# Patient Record
Sex: FEMALE | Race: WHITE | Employment: OTHER | URBAN - METROPOLITAN AREA
[De-identification: names, ages, dates, MRNs, and addresses within clinical notes are randomized per-mention and may not be internally consistent; named-entity substitution may affect disease eponyms.]

---

## 2017-11-29 ENCOUNTER — OFFICE VISIT (OUTPATIENT)
Dept: SURGERY | Age: 67
End: 2017-11-29

## 2017-11-29 VITALS
SYSTOLIC BLOOD PRESSURE: 140 MMHG | TEMPERATURE: 98.2 F | OXYGEN SATURATION: 97 % | RESPIRATION RATE: 16 BRPM | WEIGHT: 190 LBS | HEART RATE: 71 BPM | DIASTOLIC BLOOD PRESSURE: 80 MMHG | BODY MASS INDEX: 35.87 KG/M2 | HEIGHT: 61 IN

## 2017-11-29 DIAGNOSIS — M79.662 PAIN OF LEFT CALF: Primary | ICD-10-CM

## 2017-11-29 NOTE — PROGRESS NOTES
Surgery Consult    Subjective:      Arnel Huntley is a 77 y.o.  female who was referred for evaluation of pain in the soft tissues of the left lower anterolateral calf. No systemic symptoms, and the pain is not sconstant.     Patient Active Problem List    Diagnosis Date Noted    Pain of left calf 11/29/2017    Spondylolisthesis of lumbar region 07/06/2016    Fibromyalgia syndrome 11/05/2010    Left knee DJD 11/03/2010    Right knee DJD 11/03/2010    Hx-TIA (transient ischemic attack) 11/03/2010    Radiculopathy of leg 11/03/2010    Dry eyes 11/03/2010     Past Medical History:   Diagnosis Date    Arrhythmia     \"SKIPS BEAT\"    Arthritis     Cancer ([address]     BCCA    GERD (gastroesophageal reflux disease)     Nausea & vomiting     Other ill-defined conditions(799.89) 1980s    Shingles    Pain of left calf 11/29/2017    Psychiatric disorder     Anxiety    Spondylolisthesis of lumbar region 7/6/2016    Stroke ([address] 2006    TIA    Thromboembolus ([address] 1986 2000    LEFT DVT    Thyroid disease     Unspecified adverse effect of anesthesia     Headaches      Past Surgical History:   Procedure Laterality Date    BREAST SURGERY PROCEDURE UNLISTED Right 1984    Excision of Benign Mass    DRAIN/INJECT LARGE JOINT/BURSA  11/5/2010    STEROID INJECTION performed by Enma Morse at [address] HX HERNIA REPAIR Right 1990'S    INGUINAL    HX HYSTERECTOMY  1985    PARTIAL    HX KNEE ARTHROSCOPY Left     HX ORTHOPAEDIC Bilateral 2008    Bilateral Thumb surgery    HX ORTHOPAEDIC Left 2012    REVISION TOTAL KNEE    HX ORTHOPAEDIC  2016    back surgery    HX TONSILLECTOMY  1975    TOTAL KNEE ARTHROPLASTY  11/5/2010    KNEE ARTHROPLASTY TOTAL performed by Enma Morse at [address] [place] [address] MAIN OR      Social History   Substance Use Topics    Smoking status: Never Smoker    Smokeless tobacco: Never Used    Alcohol use Yes      Comment: OCCASIONALLY      Family History   Problem Relation Age of Onset    Cancer Mother      BREAST AND OVARIAN    Other Mother      BRAIN ANEURYSM    Anesth Problems Neg Hx       Current Outpatient Prescriptions   Medication Sig    gabapentin (NEURONTIN) 100 mg capsule Take 100 mg by mouth three (3) times daily.  venlafaxine-SR (EFFEXOR XR) 150 mg capsule Take 150 mg by mouth nightly.  levothyroxine (SYNTHROID) 150 mcg tablet Take 150 mcg by mouth daily (before breakfast).  ergocalciferol (ERGOCALCIFEROL) 50,000 unit capsule Take 50,000 Units by mouth every seven (7) days.  zolpidem (AMBIEN) 10 mg tablet Take 10 mg by mouth nightly as needed.  oxyCODONE IR (ROXICODONE) 10 mg tab immediate release tablet Take 1 Tab by mouth every three (3) hours as needed. Max Daily Amount: 80 mg.    senna-docusate (PERICOLACE) 8.6-50 mg per tablet Take 1 Tab by mouth daily.  omega-3 fatty acids-vitamin e 1,000 mg cap Take 1 Cap by mouth daily.  azelastine (ASTELIN) 137 mcg nasal spray 1 [place] by Both Nostrils route daily. No current facility-administered medications for this visit. Allergies   Allergen Reactions    Epinephrine Unknown (comments)    Other Medication Unknown (comments)     novacaine    Sulfa (Sulfonamide Antibiotics) Unknown (comments)        Review of Systems:    A comprehensive review of systems was negative except for: Musculoskeletal: positive for myalgias and arthralgias    Objective:     @IPVITALS[8:@    @DEAK[74@    Physical Exam:  GENERAL: alert, cooperative, no distress, appears stated age, LYMPHATIC: Cervical, supraclavicular, and axillary nodes normal. , HEART: regular rate and rhythm, S1, S2 normal, no murmur, click, rub or gallop, ABDOMEN: soft, non-tender. Bowel sounds normal. No masses,  no organomegaly, EXTREMITIES:  There is an area sbout 4 cm square just above the lateral malleolus which is tender  But there is no erythema , swelling or change in physical consistency, although she thinks it feels thicker.     Labs: No results found for this or any previous visit (from the past 24 hour(s)). Assessment:     Painful soft tissue of left lower leg    Plan: Will call  to see what role I might be able to play in this.     Signed By: Yadira Tello MD     November 29, 2017

## 2017-11-29 NOTE — MR AVS SNAPSHOT
Visit Information Date & Time Provider Department Dept. Phone Encounter #  
 11/29/2017  3:00 PM Denae ChristRavi [phone] [id number] Upcoming Health Maintenance Date Due Hepatitis C Screening 1950 DTaP/Tdap/Td series (1 - Tdap) 12/10/1971 BREAST CANCER SCRN MAMMOGRAM 12/10/2000 FOBT Q 1 YEAR AGE 50-75 12/10/2000 ZOSTER VACCINE AGE 60> 10/10/2010 GLAUCOMA SCREENING Q2Y 12/10/2015 OSTEOPOROSIS SCREENING (DEXA) 12/10/2015 Pneumococcal 65+ Low/Medium Risk (1 of 2 - PCV13) 12/10/2015 MEDICARE YEARLY EXAM 12/10/2015 Influenza Age 5 to Adult 8/1/2017 Allergies as of 11/29/2017  Review Complete On: 11/29/2017 By: Denae Polo MD  
  
 Severity Noted Reaction Type Reactions Epinephrine  10/31/2010    Unknown (comments) Other Medication  10/31/2010   Intolerance Unknown (comments)  
 novacaine Sulfa (Sulfonamide Antibiotics)  10/31/2010    Unknown (comments) Current Immunizations  Reviewed on 11/5/2010 No immunizations on file. Not reviewed this visit You Were Diagnosed With   
  
 Codes Comments Pain of left calf    -  Primary ICD-10-CM: I88.024 ICD-9-CM: 729.5 Vitals BP Pulse Temp Resp Height(growth percentile) Weight(growth percentile) 140/80 (BP 1 Location: Left arm, BP Patient Position: Sitting) 71 98.2 °F (36.8 °C) (Oral) 16 5' 1\" (1.549 m) 190 lb (86.2 kg) SpO2 BMI OB Status Smoking Status 97% 35.9 kg/m2 Hysterectomy Never Smoker BMI and BSA Data Body Mass Index Body Surface Area 35.9 kg/m 2 1.93 m 2 Preferred Pharmacy Pharmacy Name Phone [id number] [place], [address] [facility] [phone] Your Updated Medication List  
  
   
This list is accurate as of: 11/29/17  5:02 PM.  Always use your most recent med list.  
  
  
  
  
 AMBIEN 10 mg tablet Generic drug:  zolpidem Take 10 mg by mouth nightly as needed. azelastine 137 mcg (0.1 %) nasal spray Commonly known as:  ASTELIN  
1 Spray by Both Nostrils route daily. EFFEXOR  mg capsule Generic drug:  venlafaxine-SR Take 150 mg by mouth nightly.  
  
 ergocalciferol 50,000 unit capsule Commonly known as:  ERGOCALCIFEROL Take 50,000 Units by mouth every seven (7) days. gabapentin 100 mg capsule Commonly known as:  NEURONTIN Take 100 mg by mouth three (3) times daily. levothyroxine 150 mcg tablet Commonly known as:  SYNTHROID Take 150 mcg by mouth daily (before breakfast). omega-3 fatty acids-vitamin e 1,000 mg Cap Take 1 Cap by mouth daily. oxyCODONE IR 10 mg Tab immediate release tablet Commonly known as:  Romelia Fang Take 1 Tab by mouth every three (3) hours as needed. Max Daily Amount: 80 mg.  
  
 senna-docusate 8.6-50 mg per tablet Commonly known as:  Sonya Manna Take 1 Tab by mouth daily. Introducing [facility]! Yane Dennis introduces [employer] patient portal. Now you can access parts of your medical record, email your doctor's office, and request medication refills online. 1. In your internet browser, go to [email or web address] 2. Click on the First Time User? Click Here link in the Sign In box. You will see the New Member Sign Up page. 3. Enter your [employer] Access Code exactly as it appears below. You will not need to use this code after youve completed the sign-up process. If you do not sign up before the expiration date, you must request a new code. · [employer] Access Code: [email or web address] Expires: 2/27/2018  5:02 PM 
 
4. Enter the last four digits of your Social Security Number (xxxx) and Date of Birth (mm/dd/yyyy) as indicated and click Submit. You will be taken to the next sign-up page. 5. Create a [employer] ID.  This will be your [employer] login ID and cannot be changed, so think of one that is secure and easy to remember. 6. Create a [employer] password. You can change your password at any time. 7. Enter your Password Reset Question and Answer. This can be used at a later time if you forget your password. 8. Enter your e-mail address. You will receive e-mail notification when new information is available in [address] 9. Click Sign Up. You can now view and download portions of your medical record. 10. Click the Download Summary menu link to download a portable copy of your medical information. If you have questions, please visit the Frequently Asked Questions section of the [employer] website. Remember, [employer] is NOT to be used for urgent needs. For medical emergencies, dial 911. Now available from your iPhone and Android! Please provide this summary of care documentation to your next provider. Your primary care clinician is listed as Waldo Russell. If you have any questions after today's visit, please call [phone].

## 2017-11-29 NOTE — PROGRESS NOTES
1. Have you been to the ER, urgent care clinic since your last visit? Hospitalized since your last visit? No    2. Have you seen or consulted any other health care providers outside of the [facility] since your last visit? Include any pap smears or colon screening. No    Patient states acouple of days a week she feels depressed because of pain and thinks its just from getting older. I asked her if she had discussed this with her PCP and she said no. Dr. Sharif Hilton was made aware.